# Patient Record
Sex: MALE | Race: WHITE | Employment: OTHER | URBAN - METROPOLITAN AREA
[De-identification: names, ages, dates, MRNs, and addresses within clinical notes are randomized per-mention and may not be internally consistent; named-entity substitution may affect disease eponyms.]

---

## 2018-06-04 PROBLEM — M79.10 MYALGIA: Status: ACTIVE | Noted: 2018-06-04

## 2020-11-04 ENCOUNTER — APPOINTMENT (OUTPATIENT)
Dept: GENERAL RADIOLOGY | Facility: HOSPITAL | Age: 85
End: 2020-11-04
Attending: EMERGENCY MEDICINE
Payer: MEDICARE

## 2020-11-04 ENCOUNTER — HOSPITAL ENCOUNTER (EMERGENCY)
Facility: HOSPITAL | Age: 85
Discharge: HOME | End: 2020-11-04
Attending: EMERGENCY MEDICINE
Payer: MEDICARE

## 2020-11-04 VITALS
SYSTOLIC BLOOD PRESSURE: 136 MMHG | HEART RATE: 72 BPM | DIASTOLIC BLOOD PRESSURE: 69 MMHG | RESPIRATION RATE: 18 BRPM | OXYGEN SATURATION: 95 % | TEMPERATURE: 98 F

## 2020-11-04 DIAGNOSIS — R13.10 DYSPHAGIA, UNSPECIFIED TYPE: Primary | ICD-10-CM

## 2020-11-04 PROCEDURE — 99285 EMERGENCY DEPT VISIT HI MDM: CPT

## 2020-11-04 PROCEDURE — 70360 X-RAY EXAM OF NECK: CPT | Performed by: EMERGENCY MEDICINE

## 2020-11-04 PROCEDURE — 80053 COMPREHEN METABOLIC PANEL: CPT | Performed by: EMERGENCY MEDICINE

## 2020-11-04 PROCEDURE — 71046 X-RAY EXAM CHEST 2 VIEWS: CPT | Performed by: EMERGENCY MEDICINE

## 2020-11-04 PROCEDURE — 96361 HYDRATE IV INFUSION ADD-ON: CPT

## 2020-11-04 PROCEDURE — 85025 COMPLETE CBC W/AUTO DIFF WBC: CPT | Performed by: EMERGENCY MEDICINE

## 2020-11-04 PROCEDURE — 96360 HYDRATION IV INFUSION INIT: CPT

## 2020-11-04 PROCEDURE — 99284 EMERGENCY DEPT VISIT MOD MDM: CPT

## 2020-11-04 RX ORDER — NICOTINE POLACRILEX 4 MG/1
20 GUM, CHEWING ORAL DAILY
Qty: 30 TABLET | Refills: 0 | Status: SHIPPED | OUTPATIENT
Start: 2020-11-04 | End: 2020-12-04

## 2020-11-04 NOTE — ED PROVIDER NOTES
Patient Seen in: [facility] Emergency Department      History   Patient presents with: Other    Stated Complaint: diff eatting, vomiting    HPI    70-year-old male complaint of difficulty swallowing.   The patient states that 2 days ago he had an ep 136/69   Pulse 72   Temp 98 °F (36.7 °C) (Temporal)   Resp 18   SpO2 95%         Physical Exam    Patient is alert and oriented x3 no acute distress HEENT exam the oropharynx is clear  The neck is supple no neck rigidity lymphadenopathy or JVD.   Lungs are given IV fluids. Case was discussed with gastroenterology the patient was advised to follow-up over the next couple of days and to have liquid diet tomorrow then advance diet very soft diet after that he was given omeprazole.   I believe he probably has a

## 2021-02-17 PROBLEM — U07.1 LAB TEST POSITIVE FOR DETECTION OF COVID-19 VIRUS: Status: ACTIVE | Noted: 2021-02-17

## 2023-05-18 ENCOUNTER — HOSPITAL ENCOUNTER (OUTPATIENT)
Dept: MRI IMAGING | Age: 88
Discharge: HOME | End: 2023-05-18
Attending: STUDENT IN AN ORGANIZED HEALTH CARE EDUCATION/TRAINING PROGRAM
Payer: MEDICARE

## 2023-05-18 DIAGNOSIS — R22.32 MASS OF LEFT ELBOW: ICD-10-CM

## 2023-05-18 PROCEDURE — A9575 INJ GADOTERATE MEGLUMI 0.1ML: HCPCS | Performed by: STUDENT IN AN ORGANIZED HEALTH CARE EDUCATION/TRAINING PROGRAM

## 2023-05-18 PROCEDURE — 73223 MRI JOINT UPR EXTR W/O&W/DYE: CPT | Performed by: STUDENT IN AN ORGANIZED HEALTH CARE EDUCATION/TRAINING PROGRAM

## 2023-05-18 RX ORDER — GADOTERATE MEGLUMINE 376.9 MG/ML
15 INJECTION INTRAVENOUS
Status: COMPLETED | OUTPATIENT
Start: 2023-05-18 | End: 2023-05-18

## 2023-05-18 RX ADMIN — GADOTERATE MEGLUMINE 15 ML: 376.9 INJECTION INTRAVENOUS at 08:59:00

## 2024-08-22 RX ORDER — ACETAMINOPHEN 500 MG
500 TABLET ORAL EVERY 6 HOURS PRN
COMMUNITY

## 2024-08-22 RX ORDER — TAMSULOSIN HYDROCHLORIDE 0.4 MG/1
0.4 CAPSULE ORAL NIGHTLY
COMMUNITY

## 2024-08-23 ENCOUNTER — HOSPITAL ENCOUNTER (OUTPATIENT)
Facility: HOSPITAL | Age: 89
Setting detail: HOSPITAL OUTPATIENT SURGERY
Discharge: HOME | End: 2024-08-23
Attending: INTERNAL MEDICINE | Admitting: INTERNAL MEDICINE
Payer: MEDICARE

## 2024-08-23 ENCOUNTER — ANESTHESIA EVENT (OUTPATIENT)
Dept: ENDOSCOPY | Facility: HOSPITAL | Age: 89
End: 2024-08-23
Payer: MEDICARE

## 2024-08-23 ENCOUNTER — LAB ENCOUNTER (OUTPATIENT)
Dept: LAB | Facility: HOSPITAL | Age: 89
End: 2024-08-23
Attending: INTERNAL MEDICINE
Payer: MEDICARE

## 2024-08-23 ENCOUNTER — ANESTHESIA (OUTPATIENT)
Dept: ENDOSCOPY | Facility: HOSPITAL | Age: 89
End: 2024-08-23
Payer: MEDICARE

## 2024-08-23 VITALS
RESPIRATION RATE: 18 BRPM | OXYGEN SATURATION: 94 % | WEIGHT: 139 LBS | TEMPERATURE: 98 F | BODY MASS INDEX: 23.16 KG/M2 | HEART RATE: 65 BPM | DIASTOLIC BLOOD PRESSURE: 51 MMHG | HEIGHT: 65 IN | SYSTOLIC BLOOD PRESSURE: 113 MMHG

## 2024-08-23 LAB
ANION GAP SERPL CALC-SCNC: <0 MMOL/L (ref 0–18)
ANTIBODY SCREEN: NEGATIVE
BASOPHILS # BLD AUTO: 0.04 X10(3) UL (ref 0–0.2)
BASOPHILS NFR BLD AUTO: 0.8 %
BUN BLD-MCNC: 26 MG/DL (ref 9–23)
CALCIUM BLD-MCNC: 8.6 MG/DL (ref 8.7–10.4)
CHLORIDE SERPL-SCNC: 110 MMOL/L (ref 98–112)
CO2 SERPL-SCNC: 30 MMOL/L (ref 21–32)
CREAT BLD-MCNC: 0.83 MG/DL
EGFRCR SERPLBLD CKD-EPI 2021: 84 ML/MIN/1.73M2 (ref 60–?)
EOSINOPHIL # BLD AUTO: 0.04 X10(3) UL (ref 0–0.7)
EOSINOPHIL NFR BLD AUTO: 0.8 %
ERYTHROCYTE [DISTWIDTH] IN BLOOD BY AUTOMATED COUNT: 13.8 %
GLUCOSE BLD-MCNC: 106 MG/DL (ref 70–99)
HCT VFR BLD AUTO: 33.8 %
HGB BLD-MCNC: 11 G/DL
IMM GRANULOCYTES # BLD AUTO: 0.02 X10(3) UL (ref 0–1)
IMM GRANULOCYTES NFR BLD: 0.4 %
LYMPHOCYTES # BLD AUTO: 1.21 X10(3) UL (ref 1–4)
LYMPHOCYTES NFR BLD AUTO: 23 %
MCH RBC QN AUTO: 32.3 PG (ref 26–34)
MCHC RBC AUTO-ENTMCNC: 32.5 G/DL (ref 31–37)
MCV RBC AUTO: 99.1 FL
MONOCYTES # BLD AUTO: 0.67 X10(3) UL (ref 0.1–1)
MONOCYTES NFR BLD AUTO: 12.8 %
NEUTROPHILS # BLD AUTO: 3.27 X10 (3) UL (ref 1.5–7.7)
NEUTROPHILS # BLD AUTO: 3.27 X10(3) UL (ref 1.5–7.7)
NEUTROPHILS NFR BLD AUTO: 62.2 %
OSMOLALITY SERPL CALC.SUM OF ELEC: 293 MOSM/KG (ref 275–295)
PLATELET # BLD AUTO: 154 10(3)UL (ref 150–450)
POTASSIUM SERPL-SCNC: 4 MMOL/L (ref 3.5–5.1)
RBC # BLD AUTO: 3.41 X10(6)UL
RH BLOOD TYPE: POSITIVE
SODIUM SERPL-SCNC: 139 MMOL/L (ref 136–145)
WBC # BLD AUTO: 5.3 X10(3) UL (ref 4–11)

## 2024-08-23 PROCEDURE — 0DB18ZX EXCISION OF UPPER ESOPHAGUS, VIA NATURAL OR ARTIFICIAL OPENING ENDOSCOPIC, DIAGNOSTIC: ICD-10-PCS | Performed by: INTERNAL MEDICINE

## 2024-08-23 PROCEDURE — 86850 RBC ANTIBODY SCREEN: CPT | Performed by: INTERNAL MEDICINE

## 2024-08-23 PROCEDURE — 0DB38ZX EXCISION OF LOWER ESOPHAGUS, VIA NATURAL OR ARTIFICIAL OPENING ENDOSCOPIC, DIAGNOSTIC: ICD-10-PCS | Performed by: INTERNAL MEDICINE

## 2024-08-23 PROCEDURE — 0D758ZZ DILATION OF ESOPHAGUS, VIA NATURAL OR ARTIFICIAL OPENING ENDOSCOPIC: ICD-10-PCS | Performed by: INTERNAL MEDICINE

## 2024-08-23 PROCEDURE — 86901 BLOOD TYPING SEROLOGIC RH(D): CPT | Performed by: INTERNAL MEDICINE

## 2024-08-23 PROCEDURE — 86900 BLOOD TYPING SEROLOGIC ABO: CPT | Performed by: INTERNAL MEDICINE

## 2024-08-23 PROCEDURE — 88305 TISSUE EXAM BY PATHOLOGIST: CPT | Performed by: INTERNAL MEDICINE

## 2024-08-23 PROCEDURE — 0DB28ZX EXCISION OF MIDDLE ESOPHAGUS, VIA NATURAL OR ARTIFICIAL OPENING ENDOSCOPIC, DIAGNOSTIC: ICD-10-PCS | Performed by: INTERNAL MEDICINE

## 2024-08-23 PROCEDURE — 0DB68ZX EXCISION OF STOMACH, VIA NATURAL OR ARTIFICIAL OPENING ENDOSCOPIC, DIAGNOSTIC: ICD-10-PCS | Performed by: INTERNAL MEDICINE

## 2024-08-23 PROCEDURE — 85025 COMPLETE CBC W/AUTO DIFF WBC: CPT | Performed by: INTERNAL MEDICINE

## 2024-08-23 PROCEDURE — 88312 SPECIAL STAINS GROUP 1: CPT | Performed by: INTERNAL MEDICINE

## 2024-08-23 PROCEDURE — 88342 IMHCHEM/IMCYTCHM 1ST ANTB: CPT | Performed by: INTERNAL MEDICINE

## 2024-08-23 PROCEDURE — 0DB78ZX EXCISION OF STOMACH, PYLORUS, VIA NATURAL OR ARTIFICIAL OPENING ENDOSCOPIC, DIAGNOSTIC: ICD-10-PCS | Performed by: INTERNAL MEDICINE

## 2024-08-23 PROCEDURE — 80048 BASIC METABOLIC PNL TOTAL CA: CPT | Performed by: INTERNAL MEDICINE

## 2024-08-23 RX ORDER — SODIUM CHLORIDE, SODIUM LACTATE, POTASSIUM CHLORIDE, CALCIUM CHLORIDE 600; 310; 30; 20 MG/100ML; MG/100ML; MG/100ML; MG/100ML
INJECTION, SOLUTION INTRAVENOUS CONTINUOUS
Status: DISCONTINUED | OUTPATIENT
Start: 2024-08-23 | End: 2024-08-23

## 2024-08-23 RX ORDER — LIDOCAINE HYDROCHLORIDE 10 MG/ML
INJECTION, SOLUTION EPIDURAL; INFILTRATION; INTRACAUDAL; PERINEURAL AS NEEDED
Status: DISCONTINUED | OUTPATIENT
Start: 2024-08-23 | End: 2024-08-23 | Stop reason: SURG

## 2024-08-23 RX ORDER — NALOXONE HYDROCHLORIDE 0.4 MG/ML
80 INJECTION, SOLUTION INTRAMUSCULAR; INTRAVENOUS; SUBCUTANEOUS AS NEEDED
Status: DISCONTINUED | OUTPATIENT
Start: 2024-08-23 | End: 2024-08-23

## 2024-08-23 RX ORDER — ONDANSETRON 2 MG/ML
4 INJECTION INTRAMUSCULAR; INTRAVENOUS ONCE AS NEEDED
Status: DISCONTINUED | OUTPATIENT
Start: 2024-08-23 | End: 2024-08-23

## 2024-08-23 RX ADMIN — LIDOCAINE HYDROCHLORIDE 50 MG: 10 INJECTION, SOLUTION EPIDURAL; INFILTRATION; INTRACAUDAL; PERINEURAL at 12:06:00

## 2024-08-23 NOTE — BRIEF OP NOTE
Pre-Operative Diagnosis: ESOPHAGEAL DYSPHAGIA   ESOPHAGEAL STRICTURE     Post-Operative Diagnosis: hiatal hernia , esophagitis and gastritis      Procedure Performed:   ESOPHAGOGASTRODUODENOSCOPY  WITH BIOPSIES AND BALLOOM DILATION TO 15 MM-16.5MM-18MM    Surgeons and Role:     * Mahin Cano MD - Primary    Assistant(s):        Surgical Findings: see op     Specimen: see op     Estimated Blood Loss: No data recorded    discharge instructions given to patient are including the following ...      Findings    1) severe  esophagitis was noted (inflammation of the esophagus).  This was biopsied .    2) a 3 cm hiatal hernia was found (part of the stomach slips up into the chest above the level of the diaphragm, often this can contribute to increased acid reflux)    3) I performed a dilation (stretch) of the esophagus to see if that helps your trouble swallowing. There was a lower esophagus narrowing/stricture noted.      4) mild gastritis was noted (inflammation in the stomach). Sometimes this can be from a bacteria called \"H.pylori\" or from medications (such as aspirin or motrin type products) or from diet/lifestyle habits (alcohol, caffeine, smoking), etc.  Biopsies were taken             --unclear if the esophagitis is chronic or if this is acute changes from the repeated episodes of vomiting from several days ago  --I would recommend increasing the omeprazole from 20 mg to 40 mg daily  --liquid diet x 24 hours, then soft diet for the nex 48 hours.  If all symptoms resolve/improve, can then consider advancing diet    --if you are willing to have a repeat upper endoscopy, we can repeat an upper endoscopy in 8 weeks to see if the esophagitis has healed          I will communicate results of the pathology with you, possibly via a letter, a phone call, or a \"mychart\" message.  You may receive the results in [facility] before I have had a chance to review the results.    Mahin Cano MD  [facility] [place]        Mahin Cano MD  8/23/2024  12:30 PM

## 2024-08-23 NOTE — DISCHARGE INSTRUCTIONS
Home Care Instructions for Gastroscopy with Sedation    Diet:  - Resume your regular diet as tolerated unless otherwise instructed.  - Start with light meals to minimize bloating.  - Do not drink alcohol today.    Medication:  - If you have questions about resuming your normal medications, please contact your Primary Care Physician.    Activities:  - Take it easy today. Do not return to work today.  - Do not drive today.  - Do not operate any machinery today (including kitchen equipment).    Gastroscopy:  - You may have a sore throat for 2-3 days following the exam. This is normal. Gargling with warm salt water (1/2 tsp salt to 1 glass warm water) or using throat lozenges will help.  - If you experience any sharp pain in your neck, abdomen or chest, vomiting of blood, oral temperature over 100 degrees Fahrenheit, light-headedness or dizziness, or any other problems, contact your doctor.    **If unable to reach your doctor, please go to the [facility] Emergency Room**    - Your referring physician will receive a full report of your examination.  - If you do not hear from your doctor's office within two weeks of your biopsy, please call them for your results.    You may be able to see your laboratory results in [facility] between 4 and 7 business days.  In some cases, your physician may not have viewed the results before they are released to [facility].  If you have questions regarding your results contact the physician who ordered the test/exam by phone or via [facility] by choosing \"Ask a Medical Question.\"           Findings    1) severe  esophagitis was noted (inflammation of the esophagus).  This was biopsied .    2) a 3 cm hiatal hernia was found (part of the stomach slips up into the chest above the level of the diaphragm, often this can contribute to increased acid reflux)    3) I performed a dilation (stretch) of the esophagus to see if that helps your trouble swallowing. There was a lower esophagus  narrowing/stricture noted.      4) mild gastritis was noted (inflammation in the stomach). Sometimes this can be from a bacteria called \"H.pylori\" or from medications (such as aspirin or motrin type products) or from diet/lifestyle habits (alcohol, caffeine, smoking), etc.  Biopsies were taken             --unclear if the esophagitis is chronic or if this is acute changes from the repeated episodes of vomiting from several days ago  --I would recommend increasing the omeprazole from 20 mg to 40 mg daily  --liquid diet x 24 hours, then soft diet for the nex 48 hours.  If all symptoms resolve/improve, can then consider advancing diet    --if you are willing to have a repeat upper endoscopy, we can repeat an upper endoscopy in 8 weeks to see if the esophagitis has healed          I will communicate results of the pathology with you, possibly via a letter, a phone call, or a \"[facility]\" message.  You may receive the results in MyChart before I have had a chance to review the results.    Mahin Cano MD  [facility] [place] [facility]

## 2024-08-23 NOTE — ANESTHESIA PREPROCEDURE EVALUATION
PRE-OP EVALUATION    Patient Name: Sahil Dougherty    Admit Diagnosis: ESOPHAGEAL DYSPHAGIA   ESOPHAGEAL STRICTURE    Pre-op Diagnosis: ESOPHAGEAL DYSPHAGIA   ESOPHAGEAL STRICTURE    ESOPHAGOGASTRODUODENOSCOPY  WITH DILATION    Anesthesia Procedure: ESOPHAGOGASTRODUODENOSCOPY  WITH DILATION    Surgeons and Role:     * Mahin Cano MD - Primary    Pre-op vitals reviewed.  Temp: 98.2 °F (36.8 °C)  Pulse: 74  Resp: 16  BP: 143/61  SpO2: 97 %  Body mass index is 23.13 kg/m².    Current medications reviewed.  Hospital Medications:  • lactated ringers infusion   Intravenous Continuous       Outpatient Medications:     Medications Prior to Admission   Medication Sig Dispense Refill Last Dose   • Apoaequorin (PREVAGEN) 10 MG Oral Cap Take 1 capsule by mouth daily.   8/22/2024   • tamsulosin 0.4 MG Oral Cap Take 1 capsule (0.4 mg total) by mouth at bedtime.   8/22/2024   • acetaminophen 500 MG Oral Tab Take 1 tablet (500 mg total) by mouth every 6 (six) hours as needed for Pain.   Past Week   • valsartan 160 MG Oral Tab Take 1 tablet (160 mg total) by mouth daily. 90 tablet 0 8/23/2024   • omeprazole 20 MG Oral Capsule Delayed Release Take 1 capsule (20 mg total) by mouth every morning before breakfast.   8/23/2024   • dilTIAZem HCl ER Beads (TIADYLT ER) 120 MG Oral Capsule SR 24 Hr Take 1 capsule (120 mg total) by mouth daily. 90 capsule 3 8/23/2024   • Misc Natural Products (T-RELIEF CBD+13) Sublingual SL Tab Take 3 drops by mouth 3 (three) times a week.   Past Week       Allergies: Patient has no known allergies.      Anesthesia Evaluation    Patient summary reviewed.    Anesthetic Complications  (-) history of anesthetic complications         GI/Hepatic/Renal                                 Cardiovascular                  (+) hypertension                                     Endo/Other    Negative endo/other ROS.                              Pulmonary    Negative pulmonary ROS.                        Neuro/Psych                              dysphagia      Past Surgical History:   Procedure Laterality Date   • Colonoscopy  10/03/2012    three polyps, repeat 3 years, Dr Carnes   • Upper gi endoscopy,exam       Social History     Socioeconomic History   • Marital status:    Tobacco Use   • Smoking status: Former     Types: Cigars     Quit date: 2006     Years since quittin.6   • Smokeless tobacco: Never   Vaping Use   • Vaping status: Never Used   Substance and Sexual Activity   • Alcohol use: Yes   • Drug use: No     History   Drug Use No     Available pre-op labs reviewed.  Lab Results   Component Value Date    WBC 5.3 2024    RBC 3.41 (L) 2024    HGB 11.0 (L) 2024    HCT 33.8 (L) 2024    MCV 99.1 2024    MCH 32.3 2024    MCHC 32.5 2024    RDW 13.8 2024    .0 2024     Lab Results   Component Value Date     2024    K 4.0 2024     2024    CO2 30.0 2024    BUN 26 (H) 2024    CREATSERUM 0.83 2024     (H) 2024    CA 8.6 (L) 2024            Airway      Mallampati: II  Mouth opening: >3 FB  TM distance: 4 - 6 cm  Neck ROM: full Cardiovascular      Rhythm: regular  Rate: normal     Dental  Comment: Pt reports multiple crowns           Pulmonary      Breath sounds clear to auscultation bilaterally.               Other findings          ASA: 2   Plan: MAC  NPO status verified and patient meets guidelines.        Comment: All anesthetic related questions were addressed.  Pt expressed understanding of and agreement with the anesthetic plan.      Plan/risks discussed with: patient and child/children              Present on Admission:  **None**

## 2024-08-23 NOTE — PROGRESS NOTES
Called pt, spoke w/ daughter (jerica). Reviewed labs, bun elevated and hg dropped.  Described mult vomiting at the initial episode several days ago, may have had miquel [place] or other etio.  Denies further bm since yesterday or new symptoms    Mult options discussed  --they will get repeat labs, will order bmp, cbc, t/s  --they will come now to have drawn  --still plan on endoscopy later today  --further recs pending the above        Mahin Cano MD  [facility] [place] [facility]

## 2024-08-23 NOTE — H&P
Sahil Dougherty presents for endoscopy.     Doing well, daughter present    States has since had bm and \"it was great, totally normal\", denies further melena. Denies other symptoms    Reviewed the repeat labs with them as well         Risk of egd including possible dilation, sedation, bleeding, perforation, infection, miss rate or issues with accuracy, etc and possible morbidity/mortalty discussed.  We discussed risk, benefit, alternatives, limitations as well as not having the procedures.  All questions answered, pt demonstrated understanding.      Past Medical History:    Anxiety state, unspecified    Colon polyp    COVID-19    Glucose intolerance (impaired glucose tolerance)    Hearing impaired person, bilateral    hearing aids    High blood pressure    MITRAL VALVE PROLAPSE    Problems with swallowing    Shingles    Unspecified essential hypertension    Vertigo    Visual impairment       No current facility-administered medications on file prior to encounter.     Current Outpatient Medications on File Prior to Encounter   Medication Sig Dispense Refill    Apoaequorin (PREVAGEN) 10 MG Oral Cap Take 1 capsule by mouth daily.      tamsulosin 0.4 MG Oral Cap Take 1 capsule (0.4 mg total) by mouth at bedtime.      acetaminophen 500 MG Oral Tab Take 1 tablet (500 mg total) by mouth every 6 (six) hours as needed for Pain.      valsartan 160 MG Oral Tab Take 1 tablet (160 mg total) by mouth daily. 90 tablet 0    omeprazole 20 MG Oral Capsule Delayed Release Take 1 capsule (20 mg total) by mouth every morning before breakfast.      dilTIAZem HCl ER Beads (TIADYLT ER) 120 MG Oral Capsule SR 24 Hr Take 1 capsule (120 mg total) by mouth daily. 90 capsule 3    Misc Natural Products (T-RELIEF CBD+13) Sublingual SL Tab Take 3 drops by mouth 3 (three) times a week.         No Known Allergies    Past Surgical History:   Procedure Laterality Date    Colonoscopy  10/03/2012    three polyps, repeat 3 years, Dr Carnes     Upper gi endoscopy,exam           ROS:  As above. Denies new ent issues, chest pain, sob, cough, change in energy level or fatigue.  Mood is stable.  Denies new arthralgias/myalgias, rashes, or paresthesias/neurologic symptoms. Denies new bleeding or bruising except as stated. ROS negative except as stated    GEN: nad  HENT: eomi, mmm, normal cephalic  CVS: reg rate  LUNGS: cta b/l  ABD: soft, non tender, non distended  EXT: no edema      A/p  1 plan egd w/ possible dilation  today. Pt demonstrates understanding and is agreeable.    Mahin Cano MD  [facility] [place] [facility]

## 2024-08-23 NOTE — OPERATIVE REPORT
ENDOSCOPY OPERATIVE REPORT    Patient Name:  Sahil Dougherty  Medical Record #: [id number]  YOB: 1935  Date of Procedure: 8/23/2024    Preoperative Diagnosis:  esophageal dysphagia, dark stools, esophagel stricture     Postoperative Diagnosis:  grade D esophagitis. 3 cm hiatal hernia, distal esophageal stricture dilated to 18 mm. Mild gastritis    Procedure Performed: Esophagogastroduodenoscopy with biopsy and balloon dilation                 Anesthesia Given: MAC          Endoscopist:   Mahin Cano MD        Procedure:   After the patient was interviewed and the procedure again discussed and questions addressed, the patient was brought to the GI Lab and monitoring of the B/P, pulse, and pulse oximetry was performed. The patient was then placed in the left lateral decubitus position and sedated with divided doses of IV medication; continuous vital signs were monitored throughout the procedure.    A time-out procedure was performed, history and physical were reviewed, medical reconciliation was complete, informed consent was obtained.     The videogastroscope was intubated into the esophagus and advanced into the duodenum under directed vision without difficulty. Then withdrawn. A thorough exam was performed.    The patient tolerated the procedure well.       Findings:    The esophagus mucosa had an overall inflamed appearance starting from mid/proximal esophagus descending to the GEJ, confluent inflammatory changes, LA grade D. No discrete ulcers or mass.  Mild stricture at level of GEJ above a roughly 3 cm hiatal hernia. The Z-line occurred  at the top of the gastric folds. Biopsies from upper, mid, lower esophagus were taken.    The gastric lumen was then entered and views of the gastric mucosa as well as retroverted views of the fundus had a mild gastritis.  No new/old blood noted. Biopsies from the prepyloric area, antrum, body, and fundus were taken for histology and for H. Pylori.  .      A normal pylorus was passed and the duodenal bulb entered, the duodenal bulb and post-bulbar duodenum were unremarkable.     The endoscope was then withdrawn into the gastric lumen and under direct endoscopic visualization a CRE Balloon was passed into the gastric lumen. The endoscope and balloon were then withdrawn and the balloon was straddled across the area of esophageal narrowing. The balloon was then inflated in a gradual/graded fashion from 15, 16.5, 18 mm, held in place and then deflated and withdrawn.  No dilation until 18 mm where mild dilation achieved      The procedure was tolerated well and upon completion and throughtout the vital signs were stable.      Specimens:  See above      Condition on discharge from procedure:  good      PLAN:    --unclear if the esophagitis is chronic or if this is acute changes from the repeated episodes of vomiting from several days ago  --I would recommend increasing the omeprazole from 20 mg to 40 mg daily  --liquid diet x 24 hours, then soft diet for the nex 48 hours.  If all symptoms resolve/improve, can then consider advancing diet    --we can repeat an upper endoscopy in 8 weeks to see if the esophagitis has healed, if pt is willing        I will communicate results of the pathology with the patient.    Patient/daughter stated it is okay to use [employer] to communicate any pathology or follow up recommendations etc        Plan is to discharge home when Anesthesia post-surgical assessment determines patient is stable and has met discharge criteria.     The patient and  were informed of the endoscopic findings and was also given a copy of findings, postoperative instructions, and postoperative precautions.    Mahin Cano MD  [facility] [place] [facility]

## 2024-08-23 NOTE — PRE-SEDATION ASSESSMENT
Physician Pre-Sedation Assessment    Pre-Sedation Assessment:    Sedation History: Previous Sedation with No Complications and Airway Assessed    We also discussed risk of dental injury, he states no loose crowns but he does have them    Cardiac: normal S1, S2  Respiratory: breath sounds clear bilaterally   Abdomen: soft, BS (+), non-tender    ASA Classification: as per anesthesia    Plan: MAC Sedation

## (undated) DEVICE — DEVICE INFL 60ML 15ATM PRSS COOK SPHR

## (undated) DEVICE — KIT MFLD FOR SPEC COLL

## (undated) DEVICE — HERCULES 3 STAGE BALLOON ESOPHAGEAL: Brand: HERCULES

## (undated) DEVICE — KIT VLV 5 PC AIR H2O SUCT BX ENDOGATOR CONN

## (undated) DEVICE — BITEBLOCK ENDOSCP 60FR MAXI STRP

## (undated) DEVICE — 10FT COMBINED O2 DELIVERY/CO2 MONITORING. FILTER WITH MICROSTREAM TYPE LUER: Brand: DUAL ADULT NASAL CANNULA

## (undated) DEVICE — 1200CC GUARDIAN II: Brand: GUARDIAN

## (undated) DEVICE — KIT CUSTOM ENDOPROCEDURE STERIS

## (undated) DEVICE — GIJAW SINGLE-USE BIOPSY FORCEPS WITH NEEDLE: Brand: GIJAW

## (undated) DEVICE — 3M™ RED DOT™ MONITORING ELECTRODE WITH FOAM TAPE AND STICKY GEL, 50/BAG, 20/CASE, 72/PLT 2570: Brand: RED DOT™